# Patient Record
Sex: MALE | Race: WHITE | URBAN - METROPOLITAN AREA
[De-identification: names, ages, dates, MRNs, and addresses within clinical notes are randomized per-mention and may not be internally consistent; named-entity substitution may affect disease eponyms.]

---

## 2017-01-27 ENCOUNTER — HISTORICAL (OUTPATIENT)
Dept: RADIOLOGY | Facility: HOSPITAL | Age: 22
End: 2017-01-27

## 2017-03-07 ENCOUNTER — HISTORICAL (OUTPATIENT)
Dept: RADIOLOGY | Facility: HOSPITAL | Age: 22
End: 2017-03-07

## 2018-01-23 ENCOUNTER — HISTORICAL (OUTPATIENT)
Dept: RADIOLOGY | Facility: HOSPITAL | Age: 23
End: 2018-01-23

## 2019-02-11 ENCOUNTER — HISTORICAL (OUTPATIENT)
Dept: RADIOLOGY | Facility: HOSPITAL | Age: 24
End: 2019-02-11

## 2020-08-21 ENCOUNTER — HISTORICAL (OUTPATIENT)
Dept: RADIOLOGY | Facility: HOSPITAL | Age: 25
End: 2020-08-21

## 2021-03-18 ENCOUNTER — HISTORICAL (OUTPATIENT)
Dept: ADMINISTRATIVE | Facility: HOSPITAL | Age: 26
End: 2021-03-18

## 2021-03-22 ENCOUNTER — HISTORICAL (OUTPATIENT)
Dept: RADIOLOGY | Facility: HOSPITAL | Age: 26
End: 2021-03-22

## 2022-04-10 ENCOUNTER — HISTORICAL (OUTPATIENT)
Dept: ADMINISTRATIVE | Facility: HOSPITAL | Age: 27
End: 2022-04-10

## 2022-04-27 VITALS
WEIGHT: 140 LBS | BODY MASS INDEX: 20.04 KG/M2 | HEIGHT: 70 IN | SYSTOLIC BLOOD PRESSURE: 110 MMHG | DIASTOLIC BLOOD PRESSURE: 74 MMHG

## 2022-05-04 NOTE — HISTORICAL [facility] CERNER
This is a historical note converted from [place]. Formatting and pictures may have been removed.  Please reference [place] for original formatting and attached multimedia. Chief Complaint  Here for L shoulder pain, no prior sx, states he been dealing with it for about 2 years now and wants to get it checked out, more irritation than pain, not taking ibuprofen prn, xr today  History of Present Illness  Patient comes in today for his first visit. ?Patient is right-hand dominant. ?Patient complains of worsening left shoulder pain for the last 2 years gradual getting worse over the last few months.? He is now taking?more commonly anti-inflammatories. ?He does have a history of a previous shoulder dislocation. ?He feels a painful pop in certain motions of his shoulder. ?He is tried rest and medication without relief.? He does have a history of thyroid cancer.  Physical Exam  Patient is well-nourished developed male he is awake alert and orient x3 is in apparent stress he is pleasant and cooperative. ?Examination left upper extremity compartments are soft and warm. ?Skin is intact with no signs or symptoms of DVT or infection.? He is tender along the?anterior lateral aspect of the left shoulder. ?He is also tender posteriorly. ?Mildly positive apprehension sign negative for sulcus negative for instability?negative pivot shift. ?He is able to forward flex and abduct 145 degrees. ?Mildly positive Douglass sign?he is neurovascular tact distally. ?X-rays 3 views left shoulder demonstrate no obvious fracture dislocation.  Assessment/Plan  1.?Dislocation of left shoulder joint?S43.005A  ?At this time we discussed his physical exam and x-ray findings. ?We have discussed his likely labral tear which is not healed. ?He remains to be symptomatic with painful pop. ?He has failed conservative treatment.? We will proceed with an MRI of his left shoulder. ?I would like to see him back next week with his results.  Ordered:  Clinic Follow  up, *Est. 03/25/21 3:00:00 CDT, Order for future visit, Dislocation of left shoulder joint  [facility]  MRI Ext Upper Joint Left W/O Contrast, Routine, 03/22/21 10:15:00 CDT, Other (please specify), S43.005A Shoulder pain, labral tear suspected, nondiagnostic xray, None, Ambulatory, Rad Type, Order for future visit, Dislocation of left shoulder joint  Labral tear of shoulder, Schedule this...  Office/Outpatient Visit Level 4 New [id number] [facility], Dislocation of left shoulder joint  [facility], 03/18/21 10:34:00 CDT  ?  2.?Labral tear of shoulder?S43.439A  Ordered:  Clinic Follow up, *Est. 03/25/21 3:00:00 CDT, Order for future visit, Dislocation of left shoulder joint  Labral tear of shoulder, [facility]  MRI Ext Upper Joint Left W/O Contrast, Routine, 03/22/21 10:15:00 CDT, Other (please specify), S43.005A Shoulder pain, labral tear suspected, nondiagnostic xray, None, Ambulatory, Rad Type, Order for future visit, Dislocation of left shoulder joint  Labral tear of shoulder, Schedule this...  Office/Outpatient Visit Level 4 New [id number] PC, Dislocation of left shoulder joint  Labral tear of shoulder, [facility], 03/18/21 10:34:00 CDT  ?  Referrals  Clinic Follow up, *Est. 03/25/21 3:00:00 CDT, Order for future visit, Dislocation of left shoulder joint  Labral tear of shoulder, [facility]   Problem List/Past Medical History  Ongoing  Thyroid cancer  Historical  No qualifying data  Procedure/Surgical History  testicular biobsy (03/13/2020)  Biopsy thyroid, percutaneous core needle  Thyroidectomy, total or complete   Medications  levothyroxine 200 mcg (0.2 mg) oral tablet, 200 mcg= 1 tab(s), Oral, Daily  Allergies  No Known Allergies  No Known Medication Allergies  Social History  Abuse/Neglect  No, 03/18/2021  Tobacco  Never (less than 100 in lifetime), N/A, 03/18/2021  Health Maintenance  Health Maintenance  ???Pending?(in the next  year)  ??? ??OverDue  ??? ? ? ?Influenza Vaccine due??10/01/20??and every 1??day(s)  ??? ? ? ?Obesity Screening due??01/01/21??and every 1??year(s)  ??? ??Due?  ??? ? ? ?Body Mass Index Check due??02/09/21??and every 1??year(s)  ??? ? ? ?Blood Pressure Screening due??03/18/21??Unknown Frequency  ??? ? ? ?Depression Screening due??03/18/21??Unknown Frequency  ??? ? ? ?Tetanus Vaccine due??03/18/21??and every 10??year(s)  ??? ??Due In Future?  ??? ? ? ?Alcohol Misuse Screening not due until??01/02/22??and every 1??year(s)  ???Satisfied?(in the past 1 year)  ??? ??Satisfied?  ??? ? ? ?ADL Screening on??03/18/21.??Satisfied by Brooke Carrillo  ??? ? ? ?Alcohol Misuse Screening on??03/18/21.??Satisfied by Brooke Carrillo  ?